# Patient Record
Sex: MALE | Race: OTHER | NOT HISPANIC OR LATINO
[De-identification: names, ages, dates, MRNs, and addresses within clinical notes are randomized per-mention and may not be internally consistent; named-entity substitution may affect disease eponyms.]

---

## 2018-10-03 PROBLEM — Z00.00 ENCOUNTER FOR PREVENTIVE HEALTH EXAMINATION: Status: ACTIVE | Noted: 2018-10-03

## 2018-10-04 ENCOUNTER — APPOINTMENT (OUTPATIENT)
Dept: ORTHOPEDIC SURGERY | Facility: CLINIC | Age: 48
End: 2018-10-04
Payer: OTHER MISCELLANEOUS

## 2018-10-04 DIAGNOSIS — Z78.9 OTHER SPECIFIED HEALTH STATUS: ICD-10-CM

## 2018-10-04 DIAGNOSIS — Z87.891 PERSONAL HISTORY OF NICOTINE DEPENDENCE: ICD-10-CM

## 2018-10-04 PROCEDURE — 73140 X-RAY EXAM OF FINGER(S): CPT | Mod: FA

## 2018-10-04 PROCEDURE — 17250 CHEM CAUT OF GRANLTJ TISSUE: CPT | Mod: 58,LT

## 2018-10-04 PROCEDURE — 99455 WORK RELATED DISABILITY EXAM: CPT

## 2018-10-10 ENCOUNTER — APPOINTMENT (OUTPATIENT)
Dept: ORTHOPEDIC SURGERY | Facility: CLINIC | Age: 48
End: 2018-10-10
Payer: OTHER MISCELLANEOUS

## 2018-10-10 PROCEDURE — 99455 WORK RELATED DISABILITY EXAM: CPT

## 2018-10-10 RX ORDER — ESCITALOPRAM OXALATE 10 MG/1
10 TABLET ORAL
Qty: 30 | Refills: 0 | Status: ACTIVE | COMMUNITY
Start: 2018-04-13

## 2018-10-10 RX ORDER — GABAPENTIN 100 MG/1
100 CAPSULE ORAL
Qty: 90 | Refills: 0 | Status: ACTIVE | COMMUNITY
Start: 2018-09-17

## 2018-10-10 RX ORDER — AZITHROMYCIN 250 MG/1
250 TABLET, FILM COATED ORAL
Qty: 6 | Refills: 0 | Status: ACTIVE | COMMUNITY
Start: 2018-05-29

## 2018-11-05 ENCOUNTER — APPOINTMENT (OUTPATIENT)
Dept: ORTHOPEDIC SURGERY | Facility: CLINIC | Age: 48
End: 2018-11-05
Payer: OTHER MISCELLANEOUS

## 2018-11-05 DIAGNOSIS — S68.119D COMPLETE TRAUMATIC METACARPOPHALANGEAL AMPUTATION OF UNSPECIFIED FINGER, SUBSEQUENT ENCOUNTER: ICD-10-CM

## 2018-11-05 PROCEDURE — 99455 WORK RELATED DISABILITY EXAM: CPT

## 2024-03-01 ENCOUNTER — APPOINTMENT (OUTPATIENT)
Dept: ORTHOPEDIC SURGERY | Facility: CLINIC | Age: 54
End: 2024-03-01
Payer: COMMERCIAL

## 2024-03-01 VITALS — BODY MASS INDEX: 32.96 KG/M2 | WEIGHT: 210 LBS | HEIGHT: 67 IN

## 2024-03-01 DIAGNOSIS — Z82.49 FAMILY HISTORY OF ISCHEMIC HEART DISEASE AND OTHER DISEASES OF THE CIRCULATORY SYSTEM: ICD-10-CM

## 2024-03-01 DIAGNOSIS — Z82.3 FAMILY HISTORY OF STROKE: ICD-10-CM

## 2024-03-01 PROCEDURE — 99203 OFFICE O/P NEW LOW 30 MIN: CPT

## 2024-03-01 PROCEDURE — 73562 X-RAY EXAM OF KNEE 3: CPT | Mod: LT

## 2024-03-01 RX ORDER — FENOFIBRATE 120 MG/1
TABLET ORAL
Refills: 0 | Status: ACTIVE | COMMUNITY

## 2024-03-01 NOTE — DISCUSSION/SUMMARY
[de-identified] : Patient has left knee pain.  At this time, I am recommending physical therapy to work on strengthening, stretching, range of motion.  Also prescribing meloxicam 15 mg to take as needed for pain and inflammation.  Patient was encouraged by heat to the area, wear compression sleeve, and apply any topical creams to give him pain relief.  Patient will follow-up in approximately 4 to 6 weeks, if the pain still persistent at that point an MRI may be warranted.  Patient encouraged to call sooner if any complications arise or symptoms worsen.  Patient agrees to above plan all questions were answered today

## 2024-03-01 NOTE — PHYSICAL EXAM
[de-identified] : Physical exam of left knee: -No erythema, edema, ecchymosis present.  Skin intact -TTP over medial joint line -Calf is soft and nontender -Positive Mynor's, negative anterior drawer, patient able to straight leg raise -Full ROM with no pain -+2 posterior tibialis pulse -Sensation intact to light touch

## 2024-03-01 NOTE — HISTORY OF PRESENT ILLNESS
[de-identified] : 53-year-old male presents for left knee pain.  This pain started atraumatically approximately 1 week ago.  Patient denies any inciting event or injury or overuse.  However, patient went to the gym yesterday was walking on treadmill and he noticed that worsened his pain afterwards.  Patient denies any instability or buckling of the knee.  Pain with weightbearing and after activity and sometimes even at rest.  Patient has been applying topical creams and heat for pain relief.  Denies any past injuries or surgeries to the knee.

## 2024-03-01 NOTE — DATA REVIEWED
[FreeTextEntry1] : X-ray images were obtained at the office today.  AP, lateral, oblique views of the left knee reveal no acute fractures, dislocations, bony abnormalities.  Mild degenerative changes medial joint line and patellofemoral compartment, small patellar enthesophyte noted inferior pole

## 2024-04-02 ENCOUNTER — APPOINTMENT (OUTPATIENT)
Dept: ORTHOPEDIC SURGERY | Facility: CLINIC | Age: 54
End: 2024-04-02
Payer: COMMERCIAL

## 2024-04-02 DIAGNOSIS — M25.562 PAIN IN LEFT KNEE: ICD-10-CM

## 2024-04-02 PROCEDURE — 99213 OFFICE O/P EST LOW 20 MIN: CPT

## 2024-04-02 RX ORDER — MELOXICAM 15 MG/1
15 TABLET ORAL
Qty: 30 | Refills: 0 | Status: ACTIVE | COMMUNITY
Start: 2024-03-01 | End: 1900-01-01

## 2024-04-02 NOTE — DISCUSSION/SUMMARY
[de-identified] : Patient is recovering well from left knee pain.  At this time I encourage patient to continue to do strengthening and stretching of the knee and take the meloxicam as needed.  Alternate with ice and heat as tolerated.  Patient will follow-up as needed if he has any recurring symptoms or pain he was encouraged to call back for further assessment.  Patient is agreeable to this plan all questions were answered today

## 2024-04-02 NOTE — HISTORY OF PRESENT ILLNESS
[de-identified] : 53-year-old male presents for follow-up left knee.  Since patient's last visit he has been doing much better.  Patient took the Mobic for approximately 2 weeks and has been doing exercises and strengthening on his own.  Patient only has residual pain randomly but does not have any pain anymore with consistent weightbearing or in the morning.

## 2024-04-02 NOTE — PHYSICAL EXAM
[de-identified] : Physical exam of left knee: No erythema, ecchymosis, edema.  No TTP of knee joint.  Full range of motion of knee with no pain

## 2025-03-25 ENCOUNTER — OUTPATIENT (OUTPATIENT)
Dept: OUTPATIENT SERVICES | Facility: HOSPITAL | Age: 55
LOS: 1 days | End: 2025-03-25
Payer: COMMERCIAL

## 2025-03-25 DIAGNOSIS — Z00.8 ENCOUNTER FOR OTHER GENERAL EXAMINATION: ICD-10-CM

## 2025-03-25 PROCEDURE — 75571 CT HRT W/O DYE W/CA TEST: CPT

## 2025-03-25 PROCEDURE — 75571 CT HRT W/O DYE W/CA TEST: CPT | Mod: 26
